# Patient Record
Sex: MALE | Race: WHITE | NOT HISPANIC OR LATINO | Employment: OTHER | URBAN - NONMETROPOLITAN AREA
[De-identification: names, ages, dates, MRNs, and addresses within clinical notes are randomized per-mention and may not be internally consistent; named-entity substitution may affect disease eponyms.]

---

## 2018-10-30 ENCOUNTER — OFFICE VISIT (OUTPATIENT)
Dept: NEUROSURGERY | Facility: CLINIC | Age: 63
End: 2018-10-30

## 2018-10-30 VITALS
DIASTOLIC BLOOD PRESSURE: 80 MMHG | BODY MASS INDEX: 28.36 KG/M2 | WEIGHT: 214 LBS | HEIGHT: 73 IN | SYSTOLIC BLOOD PRESSURE: 140 MMHG

## 2018-10-30 DIAGNOSIS — Z78.9 CURRENT NON-SMOKER: ICD-10-CM

## 2018-10-30 DIAGNOSIS — M50.20 DISPLACEMENT OF CERVICAL INTERVERTEBRAL DISC WITHOUT MYELOPATHY: Primary | ICD-10-CM

## 2018-10-30 PROCEDURE — 99204 OFFICE O/P NEW MOD 45 MIN: CPT | Performed by: NURSE PRACTITIONER

## 2018-10-30 RX ORDER — DICLOFENAC SODIUM 75 MG/1
75 TABLET, DELAYED RELEASE ORAL 2 TIMES DAILY
Qty: 60 TABLET | Refills: 2 | Status: SHIPPED | OUTPATIENT
Start: 2018-10-30

## 2018-10-30 RX ORDER — METHYLPREDNISOLONE 4 MG/1
TABLET ORAL
Qty: 21 EACH | Refills: 0 | Status: SHIPPED | OUTPATIENT
Start: 2018-10-30

## 2018-10-30 RX ORDER — TIZANIDINE 4 MG/1
4 TABLET ORAL EVERY 8 HOURS PRN
Qty: 90 TABLET | Refills: 2 | Status: SHIPPED | OUTPATIENT
Start: 2018-10-30

## 2018-10-30 NOTE — PATIENT INSTRUCTIONS

## 2018-10-30 NOTE — PROGRESS NOTES
Chief complaint:   Chief Complaint   Patient presents with   • Neck Pain     mri done at [facility]   • Headache     Mri brain done at [facility]       Subjective     HPI: This is a 63-year-old male gentleman who is referred to us by Dr Stacia Aguirre for neck pain and headaches.  He is here to be evaluated today.  He says that he had neck surgery in 1995 and never had any improvement from that surgery.  He is unsure what kind of surgery he did have.  He says the pain in his neck is been constant since the surgery but has been progressively getting worse.  There is nothing that makes the pain worse or better.  He does complain of pain in his neck that radiates up his head and causing him to have headaches.  He also does have pain in his arms that is intermittent.  It is equal in both arms.  It is worse with positioning and better when he keeps his arm still.  He says the pain will shoot down his arms in a radicular fashion to his hand.  He also does have numbness and tingling in his arms.  Denies any bowel or bladder incontinence.  He has not completed any physical therapy, chiropractic care or pain management injections.  He rates the pain on scale 0-10 at a 10.  He says it does interfere with his activities of daily living.  He is right-hand dominant.  He is currently not working.  He is .  Denies any tobacco, alcohol, or illicit drug use.  He does have a long-standing history of neck pain.    Review of Systems   HENT: Positive for sinus pressure.    Eyes: Positive for visual disturbance.   Musculoskeletal: Positive for back pain, neck pain and neck stiffness.   Neurological: Positive for weakness, light-headedness, numbness and headaches.   All other systems reviewed and are negative.       Past Medical History:   Diagnosis Date   • No known health problems      Past Surgical History:   Procedure Laterality Date   • ANKLE FUSION Right 10/01/2018     History reviewed. No  "pertinent family history.  Social History   Substance Use Topics   • Smoking status: Never Smoker   • Smokeless tobacco: Never Used   • Alcohol use Not on file       (Not in a hospital admission)  Allergies:  Codeine and Penicillins    Objective      Vital Signs  /80   Ht 185.4 cm (73\")   Wt 97.1 kg (214 lb)   BMI 28.23 kg/m²     Physical Exam   Constitutional: He is oriented to person, place, and time. He appears well-developed and well-nourished.   HENT:   Head: Normocephalic.   Eyes: Pupils are equal, round, and reactive to light. Conjunctivae, EOM and lids are normal.   Neck: Normal range of motion.   Cardiovascular: Normal rate, regular rhythm and normal heart sounds.    Pulmonary/Chest: Effort normal and breath sounds normal.   Abdominal: Normal appearance.   Musculoskeletal: Normal range of motion.        Cervical back: He exhibits pain.   Neurological: He is alert and oriented to person, place, and time. He has normal strength and normal reflexes. He displays normal reflexes. No cranial nerve deficit or sensory deficit. GCS eye subscore is 4. GCS verbal subscore is 5. GCS motor subscore is 6.   Skin: Skin is warm.   Psychiatric: He has a normal mood and affect. His speech is normal and behavior is normal. Thought content normal. Cognition and memory are normal.       Results Review: MRI of the cervical spine shows the patient does have cervical disc displacement at C3-4.  This does appear to be centrally located.  There is a question of some cord signal change at this level as well.  He does have disc degeneration present at C5-6 which may be worries had his previous surgery at          Assessment/Plan: At this point I am going to start the patient into a dedicated course of physical therapy consisting of 2-3 times a week for 4-6 weeks.  I am also to start him on diclofenac, Medrol Dosepak, and a muscle relaxer to see this will improve the pain that is experiencing.  We will follow-up again with him " in 8 weeks at which time he will see Dr. Camejo.  BMI shows that he is overweight.  BMI chart was given the patient.  He is a nonsmoker.      Pedro Luis was seen today for neck pain and headache.    Diagnoses and all orders for this visit:    Displacement of cervical intervertebral disc without myelopathy  -     Ambulatory Referral to Physical Therapy Evaluate and treat (3x a week for 3-4 weeks)    Current non-smoker    BMI 28.0-28.9,adult          I discussed the patients findings and my recommendations with patient    Leobardo Melo, APRN  10/30/18  2:09 PM

## 2019-05-29 ENCOUNTER — TELEPHONE (OUTPATIENT)
Dept: NEUROSURGERY | Age: 64
End: 2019-05-29

## 2019-05-29 NOTE — TELEPHONE ENCOUNTER
Patient states he is going to see the neurosurgeon he has seen previously. Patient was informed that his referral is good for one year if he were to change his mind. Mr. Joe Bell verbalized understanding.